# Patient Record
Sex: MALE | URBAN - METROPOLITAN AREA
[De-identification: names, ages, dates, MRNs, and addresses within clinical notes are randomized per-mention and may not be internally consistent; named-entity substitution may affect disease eponyms.]

---

## 2017-12-21 ENCOUNTER — IMPORTED ENCOUNTER (OUTPATIENT)
Dept: URBAN - METROPOLITAN AREA CLINIC 31 | Facility: CLINIC | Age: 22
End: 2017-12-21

## 2017-12-21 PROCEDURE — 92004 COMPRE OPH EXAM NEW PT 1/>: CPT

## 2017-12-21 NOTE — PATIENT DISCUSSION
1.  Refractive error - Single vision distance. 2.  Return for an appointment in 1 year for comprehensive exam. with Dr. Kirill Chapa.

## 2019-01-15 ENCOUNTER — IMPORTED ENCOUNTER (OUTPATIENT)
Dept: URBAN - METROPOLITAN AREA CLINIC 31 | Facility: CLINIC | Age: 24
End: 2019-01-15

## 2019-01-15 PROCEDURE — 92014 COMPRE OPH EXAM EST PT 1/>: CPT

## 2019-01-15 NOTE — PATIENT DISCUSSION
1.  Refractive error - Glasses change optional. 2.  Return for an appointment in 1 year for comprehensive exam. with Dr. Ruperto Cervantes.

## 2019-01-15 NOTE — PATIENT DISCUSSION
1.  Refractive error - Single vision distance. 2.  Return for an appointment in 1 year for comprehensive exam. with Dr. Dany Aguirre.

## 2020-05-08 ENCOUNTER — IMPORTED ENCOUNTER (OUTPATIENT)
Dept: URBAN - METROPOLITAN AREA CLINIC 31 | Facility: CLINIC | Age: 25
End: 2020-05-08

## 2020-05-08 PROCEDURE — 92014 COMPRE OPH EXAM EST PT 1/>: CPT

## 2020-05-08 PROCEDURE — 92015 DETERMINE REFRACTIVE STATE: CPT

## 2020-05-08 NOTE — PATIENT DISCUSSION
1.  Refractive error - Single vision distance. 2.  Return for an appointment in 1 year for comprehensive exam. with Dr. Tiffanie Travis.

## 2022-04-02 ASSESSMENT — VISUAL ACUITY
OS_PH: SC 20/20
OS_CC: 20/40
OD_PH: SC 20/20 -2
OD_CC: 20/50+2
OD_PH: SC 20/20
OS_PH: SC 20/20
OS_SC: J1+
OD_SC: J1+
OS_CC: 20/50
OD_CC: 20/100

## 2022-04-02 ASSESSMENT — TONOMETRY
OS_IOP_MMHG: 11
OD_IOP_MMHG: 16
OS_IOP_MMHG: 16
OD_IOP_MMHG: 16
OS_IOP_MMHG: 15
OD_IOP_MMHG: 11